# Patient Record
Sex: FEMALE | Race: WHITE | Employment: OTHER | URBAN - METROPOLITAN AREA
[De-identification: names, ages, dates, MRNs, and addresses within clinical notes are randomized per-mention and may not be internally consistent; named-entity substitution may affect disease eponyms.]

---

## 2024-03-20 ENCOUNTER — APPOINTMENT (OUTPATIENT)
Dept: GENERAL RADIOLOGY | Age: 34
End: 2024-03-20
Attending: NURSE PRACTITIONER
Payer: COMMERCIAL

## 2024-03-20 ENCOUNTER — HOSPITAL ENCOUNTER (EMERGENCY)
Age: 34
Discharge: HOME | End: 2024-03-20
Attending: EMERGENCY MEDICINE
Payer: COMMERCIAL

## 2024-03-20 VITALS
DIASTOLIC BLOOD PRESSURE: 78 MMHG | SYSTOLIC BLOOD PRESSURE: 110 MMHG | OXYGEN SATURATION: 99 % | WEIGHT: 187.38 LBS | TEMPERATURE: 99 F | RESPIRATION RATE: 16 BRPM | HEART RATE: 99 BPM | BODY MASS INDEX: 27.75 KG/M2 | HEIGHT: 68.9 IN

## 2024-03-20 DIAGNOSIS — S61.412A LACERATION OF LEFT HAND WITHOUT FOREIGN BODY, INITIAL ENCOUNTER: Primary | ICD-10-CM

## 2024-03-20 PROCEDURE — 12004 RPR S/N/AX/GEN/TRK7.6-12.5CM: CPT

## 2024-03-20 PROCEDURE — 99283 EMERGENCY DEPT VISIT LOW MDM: CPT

## 2024-03-20 RX ORDER — CEPHALEXIN 500 MG/1
500 CAPSULE ORAL 4 TIMES DAILY
Qty: 28 CAPSULE | Refills: 0 | Status: SHIPPED | OUTPATIENT
Start: 2024-03-20 | End: 2024-03-27

## 2024-03-20 NOTE — DISCHARGE INSTRUCTIONS
Take the antibiotics as prescribed, finish the entire course.  Keep the dressing clean, dry, intact for 24 hours.  Thereafter, may gently remove.  May clean with mild soap and water and pat to dry.  Use an over-the-counter topical antibiotic ointment like Neosporin or bacitracin over the wound area to prevent infection.  Follow-up with your doctor in 2 to 3 days for wound recheck.  May also return to this ER.  Suture removal in 14 days, may return to this ER or another healthcare facility such as your doctor's office, for suture removal.  Return sooner for new or worsening symptoms.

## 2024-03-20 NOTE — ED PROVIDER NOTES
Patient Seen in: [facility] In [place]      History     Chief Complaint   Patient presents with    Laceration/Abrasion     Stated Complaint: l hand lac    Subjective:   33-year-old female who presents with left hand laceration.  States just prior to arrival, she was cutting up a shruti.  States she excellently sustained a laceration while doing this.  States she has a cut to the webbing between the thumb and index finger of her left hand.  States she does not think her tetanus is updated, however she refused a tetanus vaccination.  No use of blood thinners.  No loss of range of motion.            Objective:   History reviewed. No pertinent past medical history.           No pertinent past surgical history.              No pertinent social history.            Review of Systems   Constitutional: Negative.    Skin:  Positive for wound.   All other systems reviewed and are negative.      Positive for stated complaint: l hand lac  Other systems are as noted in HPI.  Constitutional and vital signs reviewed.      All other systems reviewed and negative except as noted above.    Physical Exam     ED Triage Vitals [03/20/24 0853]   /83   Pulse 103   Resp 16   Temp 98.7 °F (37.1 °C)   Temp src Temporal   SpO2 98 %   O2 Device None (Room air)       Current:/83   Pulse 103   Temp 98.7 °F (37.1 °C) (Temporal)   Resp 16   Ht 175 cm (5' 8.9\")   Wt 85 kg   LMP 03/13/2024 (Approximate)   SpO2 98%   BMI 27.75 kg/m²         Physical Exam  Vitals and nursing note reviewed.   Constitutional:       General: She is not in acute distress.     Appearance: She is not ill-appearing, toxic-appearing or diaphoretic.   Musculoskeletal:      Left hand: Laceration present. Normal range of motion. Normal sensation. Normal capillary refill. Normal pulse.      Comments: Gaping laceration to the webbing of the left hand between the first and second digits.  Bleeding controlled with direct pressure.   Skin:      General: Skin is warm and dry.      Capillary Refill: Capillary refill takes less than 2 seconds.      Coloration: Skin is not pale.   Neurological:      Mental Status: She is alert.   Psychiatric:         Mood and Affect: Mood normal.               ED Course   Labs Reviewed - No data to display  Verbal consent for the following procedure was obtained.  We discussed the inherent risks of procedure.  We discussed benefits.  Patient agrees to proceed with the procedure and verbalizes understanding of potential complications.  Laceration Repair Procedure Note  I discussed risks and benefits and verbal consent was obtained. Time out performed.  Location: Hand  left  Length: 8 cm  Cleaning: standard  Foreign Body: no foreign bodies  The wound area was irrigated with sterile saline and draped in a sterile fashion.  The wound area was anesthetized with Lidocaine 1% without epinephrine.  The wound was explored with the following results No foreign bodies found.  The wound was repaired with 4-0 Ethilon;  15  sutures were used.  Suture Technique: Simple  The wound was dressed and antibiotic ointment was applied.  Patient tolerated the procedure without complications.   Bulky dressing placed.                   MDM                                         Medical Decision Making  Differential diagnosis initially included but was not limited to: Hand laceration, foreign body    Nontoxic 33-year-old female with left hand laceration.  No neurovascular deficit.  No visible tendons or foreign body.  No bony tenderness.  CMS intact.  I do not believe patient did any injury to her tendon or bones.  Please see procedure note for laceration repair.  Wound/suture care instructions provided.  Refused tetanus vaccination.  Will prophylactically treat with antibiotics.    Supportive/home management of diagnosis/illness/injury discussed. Red flag symptoms discussed.  Signs and symptoms/criteria that would necessitate reevaluation, including  ER evaluation discussed.  Patient and/or responsible adult verbalize and agree with management and plan of care.    Speech recognition software was used during this dictation.  There may be minor errors in transcription.              Amount and/or Complexity of Data Reviewed  Radiology: ordered and independent interpretation performed. Decision-making details documented in ED Course.  ECG/medicine tests: ordered. Decision-making details documented in ED Course.        Disposition and Plan     Clinical Impression:  1. Laceration of left hand without foreign body, initial encounter         Disposition:  Discharge  3/20/2024  9:53 am    Follow-up:  Sherry Quevedo,     Call in 2 day(s)  For wound re-check    [facility] in [address] [address]  [phone]  Go to  For suture removal in 14 days.  Return sooner for new or worsening symptoms.          Medications Prescribed:  Current Discharge Medication List        START taking these medications    Details   cephalexin 500 MG Oral Cap Take 1 capsule (500 mg total) by mouth 4 (four) times daily for 7 days.  Qty: 28 capsule, Refills: 0

## 2024-03-21 ENCOUNTER — PATIENT OUTREACH (OUTPATIENT)
Dept: CASE MANAGEMENT | Age: 34
End: 2024-03-21

## 2024-03-21 NOTE — PROGRESS NOTES
1st attempt ER f/up apt request  PCP -decline, pt stated she will go back to ED for suture removal instead  Closing encounter